# Patient Record
Sex: MALE | Race: WHITE | Employment: FULL TIME | URBAN - METROPOLITAN AREA
[De-identification: names, ages, dates, MRNs, and addresses within clinical notes are randomized per-mention and may not be internally consistent; named-entity substitution may affect disease eponyms.]

---

## 2017-01-29 ENCOUNTER — OFFICE VISIT (OUTPATIENT)
Dept: FAMILY MEDICINE CLINIC | Facility: CLINIC | Age: 32
End: 2017-01-29

## 2017-01-29 VITALS
RESPIRATION RATE: 16 BRPM | SYSTOLIC BLOOD PRESSURE: 122 MMHG | OXYGEN SATURATION: 98 % | DIASTOLIC BLOOD PRESSURE: 80 MMHG | TEMPERATURE: 98 F | HEART RATE: 83 BPM

## 2017-01-29 DIAGNOSIS — K12.0 CANKER SORE: Primary | ICD-10-CM

## 2017-01-29 PROCEDURE — 99213 OFFICE O/P EST LOW 20 MIN: CPT | Performed by: NURSE PRACTITIONER

## 2017-01-29 NOTE — PROGRESS NOTES
CHIEF COMPLAINT:   Patient presents with:  Sore Throat        HPI:   Thien Zazueta is a 28year old male presents to clinic with complaint of sore throat. Patient has had for 4-5 days. Symptoms have been constant since onset.   Patient reports following a LUNGS: clear to auscultation bilaterally, no wheezes or rhonchi. Breathing is non labored.   CARDIO: RRR without murmur  GI: good BS's,no masses, hepatosplenomegaly, or tenderness on direct palpation  EXTREMITIES: no cyanosis, clubbing or edema  LYMPH: no a Canker sores usually go away by themselves within 10 to 14 days. There is no cure for canker sores. Treatment focuses on relieving symptoms and shortening outbreaks.  Treatments may include:  · Prescription or over-the-counter skin treatments to apply to th The patient/parent indicates understanding of these issues and agrees to the plan. The patient is asked to follow up with their PCP as needed.

## 2017-01-29 NOTE — PATIENT INSTRUCTIONS
Understanding Canker Sores  Canker sores are small, painful sores inside the mouth. They occur most often on the tongue, gums, or insides of the cheeks. The medical term for canker sores is aphthous ulcers. What causes a canker sore?   The exact cause of Mouth sores that seem to be canker sores can be signs of a more serious illness. If you have other signs of illness along with mouth sores, you should talk with a healthcare provider.  Canker sores can be so painful that they interfere with talking, eating,

## 2017-02-21 ENCOUNTER — OFFICE VISIT (OUTPATIENT)
Dept: FAMILY MEDICINE CLINIC | Facility: CLINIC | Age: 32
End: 2017-02-21

## 2017-02-21 VITALS
OXYGEN SATURATION: 99 % | RESPIRATION RATE: 18 BRPM | WEIGHT: 187 LBS | HEART RATE: 72 BPM | DIASTOLIC BLOOD PRESSURE: 80 MMHG | SYSTOLIC BLOOD PRESSURE: 120 MMHG | TEMPERATURE: 98 F | BODY MASS INDEX: 25 KG/M2

## 2017-02-21 DIAGNOSIS — J06.9 VIRAL URI: ICD-10-CM

## 2017-02-21 PROCEDURE — 99213 OFFICE O/P EST LOW 20 MIN: CPT | Performed by: PHYSICIAN ASSISTANT

## 2017-02-21 RX ORDER — AMOXICILLIN AND CLAVULANATE POTASSIUM 875; 125 MG/1; MG/1
1 TABLET, FILM COATED ORAL 2 TIMES DAILY
Qty: 20 TABLET | Refills: 0 | Status: SHIPPED | OUTPATIENT
Start: 2017-02-21 | End: 2017-03-03

## 2017-02-21 RX ORDER — FLUTICASONE PROPIONATE 50 MCG
2 SPRAY, SUSPENSION (ML) NASAL DAILY
Qty: 1 BOTTLE | Refills: 1 | Status: SHIPPED | OUTPATIENT
Start: 2017-02-21 | End: 2018-02-16

## 2017-02-21 NOTE — PATIENT INSTRUCTIONS
1.  Flonase as directed. 2 sprays in each nostril daily, or 1 spray in each nostril twice daily. If you develop a nosebleed, stop medication and restart at half the dose (1 spray in each nostril daily) after you have not had a nosebleed for 2 days.   If no · Stay away from cigarette smoke - both your smoke and the smoke from others. · You may use over-the-counter acetaminophen or ibuprofen for fever, muscle aching, and headache, unless another medicine was prescribed for this.  If you have chronic liver or k · Continued vomiting (can’t keep liquids down)  · Frequent diarrhea (more than 5 times a day); blood (red or black color) or mucus in diarrhea  · Feeling weak, dizzy, or like you are going to faint  · Extreme thirst  · Fever of 100.4°F (38°C) or higher, or

## 2017-02-21 NOTE — PROGRESS NOTES
CHIEF COMPLAINT:   Patient presents with:  Cough/URI: x 4 days, nasal congestion, scratchy throat, fatigue, body aches. No fever. HPI:   Liset Oakes is a 28year old male who presents for upper respiratory symptoms for  4 days.  Patient reports sor NOSE: Nostrils patent, clear nasal discharge, nasal mucosa edematous/erythematous   THROAT: Oral mucosa pink, moist. Posterior pharynx is erythematous. without exudates or hypertrophy, uvula midline, clear post nasal drainage.      NECK: Supple, non-tender 2.  Encourage fluids, humidifier/vaporizor at bedside, elevate head of bed (sleep with extra pillow), vapor rub to chest, steam therapy if no fever, warm compresses for sinus pressure if no fever, salt water gargles for sore throat, lozenges for sore throa · You may use over-the-counter acetaminophen or ibuprofen for fever, muscle aching, and headache, unless another medicine was prescribed for this.  If you have chronic liver or kidney disease or ever had a stomach ulcer or GI bleeding, talk with your doctor · Frequent diarrhea (more than 5 times a day); blood (red or black color) or mucus in diarrhea  · Feeling weak, dizzy, or like you are going to faint  · Extreme thirst  · Fever of 100.4°F (38°C) or higher, or as directed by your healthcare provider  Date L

## 2017-07-29 ENCOUNTER — HOSPITAL ENCOUNTER (INPATIENT)
Facility: HOSPITAL | Age: 32
LOS: 7 days | Discharge: HOME | DRG: 339 | End: 2017-08-05
Attending: EMERGENCY MEDICINE | Admitting: INTERNAL MEDICINE
Payer: COMMERCIAL

## 2017-07-29 ENCOUNTER — SURGERY (OUTPATIENT)
Age: 32
End: 2017-07-29

## 2017-07-29 ENCOUNTER — APPOINTMENT (OUTPATIENT)
Dept: CT IMAGING | Age: 32
DRG: 339 | End: 2017-07-29
Attending: EMERGENCY MEDICINE
Payer: COMMERCIAL

## 2017-07-29 ENCOUNTER — ANESTHESIA (OUTPATIENT)
Dept: SURGERY | Facility: HOSPITAL | Age: 32
End: 2017-07-29

## 2017-07-29 ENCOUNTER — ANESTHESIA EVENT (OUTPATIENT)
Dept: SURGERY | Facility: HOSPITAL | Age: 32
End: 2017-07-29

## 2017-07-29 DIAGNOSIS — K35.30 ACUTE APPENDICITIS WITH LOCALIZED PERITONITIS: Primary | ICD-10-CM

## 2017-07-29 DIAGNOSIS — K35.80 ACUTE APPENDICITIS: ICD-10-CM

## 2017-07-29 LAB
ALBUMIN SERPL-MCNC: 4.4 G/DL (ref 3.5–4.8)
ALP LIVER SERPL-CCNC: 39 U/L (ref 45–117)
ALT SERPL-CCNC: 29 U/L (ref 17–63)
AST SERPL-CCNC: 17 U/L (ref 15–41)
BASOPHILS # BLD AUTO: 0.03 X10(3) UL (ref 0–0.1)
BASOPHILS NFR BLD AUTO: 0.1 %
BILIRUB SERPL-MCNC: 1.1 MG/DL (ref 0.1–2)
BILIRUB UR QL STRIP.AUTO: NEGATIVE
BUN BLD-MCNC: 17 MG/DL (ref 8–20)
CALCIUM BLD-MCNC: 9.3 MG/DL (ref 8.3–10.3)
CHLORIDE: 97 MMOL/L (ref 101–111)
CO2: 27 MMOL/L (ref 22–32)
COLOR UR AUTO: YELLOW
CREAT BLD-MCNC: 1.15 MG/DL (ref 0.7–1.3)
EOSINOPHIL # BLD AUTO: 0.01 X10(3) UL (ref 0–0.3)
EOSINOPHIL NFR BLD AUTO: 0 %
ERYTHROCYTE [DISTWIDTH] IN BLOOD BY AUTOMATED COUNT: 13.4 % (ref 11.5–16)
GLUCOSE BLD-MCNC: 163 MG/DL (ref 70–99)
GLUCOSE UR STRIP.AUTO-MCNC: NEGATIVE MG/DL
HCT VFR BLD AUTO: 46.7 % (ref 37–53)
HGB BLD-MCNC: 16 G/DL (ref 13–17)
IMMATURE GRANULOCYTE COUNT: 0.12 X10(3) UL (ref 0–1)
IMMATURE GRANULOCYTE RATIO %: 0.5 %
LEUKOCYTE ESTERASE UR QL STRIP.AUTO: NEGATIVE
LIPASE: 58 U/L (ref 73–393)
LYMPHOCYTES # BLD AUTO: 0.65 X10(3) UL (ref 0.9–4)
LYMPHOCYTES NFR BLD AUTO: 2.8 %
M PROTEIN MFR SERPL ELPH: 8.1 G/DL (ref 6.1–8.3)
MCH RBC QN AUTO: 28.5 PG (ref 27–33.2)
MCHC RBC AUTO-ENTMCNC: 34.3 G/DL (ref 31–37)
MCV RBC AUTO: 83.2 FL (ref 80–99)
MONOCYTES # BLD AUTO: 0.71 X10(3) UL (ref 0.1–0.6)
MONOCYTES NFR BLD AUTO: 3.1 %
NEUTROPHIL ABS PRELIM: 21.47 X10 (3) UL (ref 1.3–6.7)
NEUTROPHILS # BLD AUTO: 21.47 X10(3) UL (ref 1.3–6.7)
NEUTROPHILS NFR BLD AUTO: 93.5 %
NITRITE UR QL STRIP.AUTO: NEGATIVE
PH UR STRIP.AUTO: 5 [PH] (ref 4.5–8)
PLATELET # BLD AUTO: 288 10(3)UL (ref 150–450)
POTASSIUM SERPL-SCNC: 3.5 MMOL/L (ref 3.6–5.1)
PROT UR STRIP.AUTO-MCNC: 30 MG/DL
RBC # BLD AUTO: 5.61 X10(6)UL (ref 4.3–5.7)
RBC UR QL AUTO: NEGATIVE
RED CELL DISTRIBUTION WIDTH-SD: 40.8 FL (ref 35.1–46.3)
SODIUM SERPL-SCNC: 135 MMOL/L (ref 136–144)
SP GR UR STRIP.AUTO: 1.03 (ref 1–1.03)
UROBILINOGEN UR STRIP.AUTO-MCNC: <2 MG/DL
WBC # BLD AUTO: 23 X10(3) UL (ref 4–13)

## 2017-07-29 PROCEDURE — 80053 COMPREHEN METABOLIC PANEL: CPT | Performed by: EMERGENCY MEDICINE

## 2017-07-29 PROCEDURE — 83690 ASSAY OF LIPASE: CPT | Performed by: EMERGENCY MEDICINE

## 2017-07-29 PROCEDURE — 88304 TISSUE EXAM BY PATHOLOGIST: CPT | Performed by: SURGERY

## 2017-07-29 PROCEDURE — 96365 THER/PROPH/DIAG IV INF INIT: CPT

## 2017-07-29 PROCEDURE — 85025 COMPLETE CBC W/AUTO DIFF WBC: CPT | Performed by: EMERGENCY MEDICINE

## 2017-07-29 PROCEDURE — 99285 EMERGENCY DEPT VISIT HI MDM: CPT

## 2017-07-29 PROCEDURE — 96361 HYDRATE IV INFUSION ADD-ON: CPT

## 2017-07-29 PROCEDURE — 0DTJ4ZZ RESECTION OF APPENDIX, PERCUTANEOUS ENDOSCOPIC APPROACH: ICD-10-PCS | Performed by: SURGERY

## 2017-07-29 PROCEDURE — 74176 CT ABD & PELVIS W/O CONTRAST: CPT | Performed by: EMERGENCY MEDICINE

## 2017-07-29 PROCEDURE — 96375 TX/PRO/DX INJ NEW DRUG ADDON: CPT

## 2017-07-29 PROCEDURE — 81001 URINALYSIS AUTO W/SCOPE: CPT | Performed by: SURGERY

## 2017-07-29 RX ORDER — HYDROMORPHONE HYDROCHLORIDE 1 MG/ML
1 INJECTION, SOLUTION INTRAMUSCULAR; INTRAVENOUS; SUBCUTANEOUS EVERY 30 MIN PRN
Status: DISCONTINUED | OUTPATIENT
Start: 2017-07-29 | End: 2017-07-29

## 2017-07-29 RX ORDER — HYDROMORPHONE HYDROCHLORIDE 1 MG/ML
0.8 INJECTION, SOLUTION INTRAMUSCULAR; INTRAVENOUS; SUBCUTANEOUS EVERY 2 HOUR PRN
Status: DISCONTINUED | OUTPATIENT
Start: 2017-07-29 | End: 2017-07-29

## 2017-07-29 RX ORDER — HYDROMORPHONE HYDROCHLORIDE 1 MG/ML
0.4 INJECTION, SOLUTION INTRAMUSCULAR; INTRAVENOUS; SUBCUTANEOUS EVERY 2 HOUR PRN
Status: DISCONTINUED | OUTPATIENT
Start: 2017-07-29 | End: 2017-08-05

## 2017-07-29 RX ORDER — HYDROMORPHONE HYDROCHLORIDE 1 MG/ML
0.4 INJECTION, SOLUTION INTRAMUSCULAR; INTRAVENOUS; SUBCUTANEOUS EVERY 5 MIN PRN
Status: DISCONTINUED | OUTPATIENT
Start: 2017-07-29 | End: 2017-07-29 | Stop reason: HOSPADM

## 2017-07-29 RX ORDER — HYDROMORPHONE HYDROCHLORIDE 1 MG/ML
0.2 INJECTION, SOLUTION INTRAMUSCULAR; INTRAVENOUS; SUBCUTANEOUS EVERY 2 HOUR PRN
Status: DISCONTINUED | OUTPATIENT
Start: 2017-07-29 | End: 2017-07-29

## 2017-07-29 RX ORDER — NALOXONE HYDROCHLORIDE 0.4 MG/ML
80 INJECTION, SOLUTION INTRAMUSCULAR; INTRAVENOUS; SUBCUTANEOUS AS NEEDED
Status: DISCONTINUED | OUTPATIENT
Start: 2017-07-29 | End: 2017-07-29 | Stop reason: HOSPADM

## 2017-07-29 RX ORDER — CALCIUM CARBONATE 200(500)MG
TABLET,CHEWABLE ORAL 3 TIMES DAILY PRN
Status: DISCONTINUED | OUTPATIENT
Start: 2017-07-29 | End: 2017-07-29 | Stop reason: SDUPTHER

## 2017-07-29 RX ORDER — METOCLOPRAMIDE HYDROCHLORIDE 5 MG/ML
10 INJECTION INTRAMUSCULAR; INTRAVENOUS EVERY 6 HOURS PRN
Status: DISCONTINUED | OUTPATIENT
Start: 2017-07-29 | End: 2017-07-30

## 2017-07-29 RX ORDER — HYDROMORPHONE HYDROCHLORIDE 1 MG/ML
0.4 INJECTION, SOLUTION INTRAMUSCULAR; INTRAVENOUS; SUBCUTANEOUS EVERY 2 HOUR PRN
Status: DISCONTINUED | OUTPATIENT
Start: 2017-07-29 | End: 2017-07-29

## 2017-07-29 RX ORDER — HYDROCODONE BITARTRATE AND ACETAMINOPHEN 5; 325 MG/1; MG/1
2 TABLET ORAL EVERY 4 HOURS PRN
Status: DISCONTINUED | OUTPATIENT
Start: 2017-07-29 | End: 2017-08-05

## 2017-07-29 RX ORDER — HYDROCODONE BITARTRATE AND ACETAMINOPHEN 5; 325 MG/1; MG/1
2 TABLET ORAL AS NEEDED
Status: DISCONTINUED | OUTPATIENT
Start: 2017-07-29 | End: 2017-07-29 | Stop reason: HOSPADM

## 2017-07-29 RX ORDER — CEFOXITIN 1 G/1
INJECTION, POWDER, FOR SOLUTION INTRAVENOUS
Status: DISCONTINUED | OUTPATIENT
Start: 2017-07-29 | End: 2017-07-29 | Stop reason: HOSPADM

## 2017-07-29 RX ORDER — ONDANSETRON 2 MG/ML
4 INJECTION INTRAMUSCULAR; INTRAVENOUS AS NEEDED
Status: DISCONTINUED | OUTPATIENT
Start: 2017-07-29 | End: 2017-07-29 | Stop reason: HOSPADM

## 2017-07-29 RX ORDER — ACETAMINOPHEN 325 MG/1
650 TABLET ORAL EVERY 4 HOURS PRN
Status: DISCONTINUED | OUTPATIENT
Start: 2017-07-29 | End: 2017-08-05

## 2017-07-29 RX ORDER — DEXTROSE, SODIUM CHLORIDE, AND POTASSIUM CHLORIDE 5; .45; .15 G/100ML; G/100ML; G/100ML
INJECTION INTRAVENOUS CONTINUOUS
Status: DISCONTINUED | OUTPATIENT
Start: 2017-07-29 | End: 2017-08-05

## 2017-07-29 RX ORDER — ONDANSETRON 2 MG/ML
4 INJECTION INTRAMUSCULAR; INTRAVENOUS EVERY 6 HOURS PRN
Status: DISCONTINUED | OUTPATIENT
Start: 2017-07-29 | End: 2017-07-29

## 2017-07-29 RX ORDER — DEXTROSE, SODIUM CHLORIDE, AND POTASSIUM CHLORIDE 5; .45; .15 G/100ML; G/100ML; G/100ML
INJECTION INTRAVENOUS
Status: COMPLETED
Start: 2017-07-29 | End: 2017-07-29

## 2017-07-29 RX ORDER — HYDROCODONE BITARTRATE AND ACETAMINOPHEN 5; 325 MG/1; MG/1
1 TABLET ORAL EVERY 4 HOURS PRN
Status: DISCONTINUED | OUTPATIENT
Start: 2017-07-29 | End: 2017-08-05

## 2017-07-29 RX ORDER — HYDROMORPHONE HYDROCHLORIDE 1 MG/ML
1.2 INJECTION, SOLUTION INTRAMUSCULAR; INTRAVENOUS; SUBCUTANEOUS EVERY 2 HOUR PRN
Status: DISCONTINUED | OUTPATIENT
Start: 2017-07-29 | End: 2017-08-05

## 2017-07-29 RX ORDER — HYDROCODONE BITARTRATE AND ACETAMINOPHEN 5; 325 MG/1; MG/1
1 TABLET ORAL AS NEEDED
Status: DISCONTINUED | OUTPATIENT
Start: 2017-07-29 | End: 2017-07-29 | Stop reason: HOSPADM

## 2017-07-29 RX ORDER — HYDROMORPHONE HYDROCHLORIDE 1 MG/ML
INJECTION, SOLUTION INTRAMUSCULAR; INTRAVENOUS; SUBCUTANEOUS
Status: COMPLETED
Start: 2017-07-29 | End: 2017-07-29

## 2017-07-29 RX ORDER — ONDANSETRON 2 MG/ML
4 INJECTION INTRAMUSCULAR; INTRAVENOUS ONCE
Status: COMPLETED | OUTPATIENT
Start: 2017-07-29 | End: 2017-07-29

## 2017-07-29 RX ORDER — FAMOTIDINE 20 MG/1
20 TABLET ORAL 2 TIMES DAILY
Status: DISCONTINUED | OUTPATIENT
Start: 2017-07-29 | End: 2017-07-31

## 2017-07-29 RX ORDER — CALCIUM CARBONATE 200(500)MG
1000 TABLET,CHEWABLE ORAL 3 TIMES DAILY PRN
Status: DISCONTINUED | OUTPATIENT
Start: 2017-07-29 | End: 2017-08-05

## 2017-07-29 RX ORDER — HYDROCODONE BITARTRATE AND ACETAMINOPHEN 5; 325 MG/1; MG/1
1 TABLET ORAL EVERY 4 HOURS PRN
Qty: 30 TABLET | Refills: 0 | Status: ON HOLD | OUTPATIENT
Start: 2017-07-29 | End: 2018-11-15

## 2017-07-29 RX ORDER — SODIUM CHLORIDE, SODIUM LACTATE, POTASSIUM CHLORIDE, CALCIUM CHLORIDE 600; 310; 30; 20 MG/100ML; MG/100ML; MG/100ML; MG/100ML
INJECTION, SOLUTION INTRAVENOUS CONTINUOUS
Status: DISCONTINUED | OUTPATIENT
Start: 2017-07-29 | End: 2017-08-04

## 2017-07-29 RX ORDER — ONDANSETRON 2 MG/ML
4 INJECTION INTRAMUSCULAR; INTRAVENOUS EVERY 6 HOURS PRN
Status: DISCONTINUED | OUTPATIENT
Start: 2017-07-29 | End: 2017-08-05

## 2017-07-29 RX ORDER — BUPIVACAINE HYDROCHLORIDE AND EPINEPHRINE 2.5; 5 MG/ML; UG/ML
INJECTION, SOLUTION EPIDURAL; INFILTRATION; INTRACAUDAL; PERINEURAL AS NEEDED
Status: DISCONTINUED | OUTPATIENT
Start: 2017-07-29 | End: 2017-07-29

## 2017-07-29 RX ORDER — SODIUM CHLORIDE 9 MG/ML
INJECTION, SOLUTION INTRAVENOUS CONTINUOUS
Status: DISCONTINUED | OUTPATIENT
Start: 2017-07-29 | End: 2017-07-29

## 2017-07-29 RX ORDER — CALCIUM CARBONATE 200(500)MG
500 TABLET,CHEWABLE ORAL 3 TIMES DAILY PRN
Status: DISCONTINUED | OUTPATIENT
Start: 2017-07-29 | End: 2017-08-05

## 2017-07-29 RX ORDER — HYDROMORPHONE HYDROCHLORIDE 1 MG/ML
0.8 INJECTION, SOLUTION INTRAMUSCULAR; INTRAVENOUS; SUBCUTANEOUS EVERY 2 HOUR PRN
Status: DISCONTINUED | OUTPATIENT
Start: 2017-07-29 | End: 2017-08-05

## 2017-07-29 NOTE — OPERATIVE REPORT
Report of Operation    Jeri Buenotzer Patient Status:  Observation    1985 MRN [id number]   [facility] SURGERY Attending Malinda Zayas   Hosp Day # 0 PCP Corby Kulkarni MD         2017    Quadra Quadra [phone] The mesoappendix was taken down with the LigaSure device. This appendix was then placed in an Endo Catch bag and delivered through the 12-mm port site. All ports were introduced into the abdominal cavity.  Irrigation of the abdomen was performed until clear

## 2017-07-29 NOTE — ED PROVIDER NOTES
Patient Seen in: THE [facility] Emergency Department In [place]    History   Patient presents with:  GI Bleeding (gastrointestinal)    Stated Complaint: vomiting for 36 hours    HPI    Patient states that at about noon on Thursday of this week he developed di BMI 25.77 kg/m²         Physical Exam  The patient appears generally healthy, alert and cooperative. Nontoxic in appearance  Skin: Warm and dry without cyanosis or pallor. Turgor normal.  HEENT: No scleral icterus.   Oropharynx dry  Neck: Supple without Course  ------------------------------------------------------------  MDM     Discussed with Dr. Christopher Garcia and Dr. Sandi Lerner. Patient given dose of Zosyn and transferred to surgical reeves at [facility] for appendectomy.       Disposition and Plan     Clin

## 2017-07-29 NOTE — ED NOTES
Pt denies c/o n/v after Zofran. Pain remain at 3-4/10 after Dilaudid. Pt alert, Ox3, conversing freely w/family at bedside.

## 2017-07-29 NOTE — ANESTHESIA POSTPROCEDURE EVALUATION
[address] [facility] [employer] Patient Status:  Observation   Age/Gender 28year old male MRN [id number]   [facility] SURGERY Attending [place], [facility] [address] Day # 0 PCP Trixie Schmitz MD       Anesthesia Post-op Note    Proced

## 2017-07-29 NOTE — PROGRESS NOTES
PT ARRIVED ON UNIT, VIA OWN CAR FROM SMITA DEWITT.JESENIA. WIFE AT BEDSIDE. PT MADE COMFORTABLE, PLAN OF CARE DISCUSSED. ALL QUESTIONS ANSWERED. INSTRUCTED TO CALL IF ANY NEEDS ARISE. CALL LIGHT WITHIN REACH.

## 2017-07-29 NOTE — PLAN OF CARE
To OR via bed. UA & C/S sent prior to leaving floor. K-Jude started. Wife accompanied pt. Pro-op teaching done.

## 2017-07-29 NOTE — H&P
History and physical    Yahaira Pitcher Patient Status:  Observation    1985 MRN [id number]   [facility] SURGERY Attending Meli Berg,*   Hosp Day # 0 PCP Kathe Foster MD       Chief Complaint:    Abdominal pain    Hi Q6H PRN  •  potassium chloride 40 mEq in sodium chloride 0.9 % 250 mL IVPB, 40 mEq, Intravenous, Once  •  lactated ringers infusion, , Intravenous, Continuous  •  HYDROcodone-acetaminophen (NORCO) 5-325 MG per tab 1 tablet, 1 tablet, Oral, PRN **OR** HYDRO 17   CREATSERUM  1.15   GLU  163*   CA  9.3       Recent Labs   Lab  07/29/17   0335   ALT  29   AST  17   ALB  4.4       No results for input(s): TROP in the last 72 hours.           Radiology:   Ct Appendix Abd/pel Wo Contrast (cpt=74176)    Result Date: or bowel wall thickening. ABDOMINAL WALL:  Normal.  No mass or hernia. URINARY BLADDER:  Normal.  No visible focal wall thickening, lesion, or calculus. PELVIC NODES:  Normal.  No adenopathy. PELVIC ORGANS:  Normal.  No visible mass.   Pelvic organs stacie

## 2017-07-29 NOTE — ED NOTES
Report given to [facility], room 301. Saline lock flushed, Coban applied for transport. Pt to transport to [facility] by private vehicle, aware of NPO status during transport. Spoke w/Grace RN in surgery at ED, aware of pt direct admit to room 301.

## 2017-07-29 NOTE — H&P
[facility] Hospitalist History and Physical      CC: Abd pain    PCP: Sherrill Hernandez MD    History of Present Illness: Patient is a 28year old male with PMH sig for allergic rhinitis presented to ED with a few days of midabdominal pain with nausea and vomitin otherwise noted in HPI    Objective:  /81 (BP Location: Right arm)   Pulse 92   Temp 99.2 °F (37.3 °C) (Oral)   Resp 16   Ht 182.9 cm (6')   Wt 190 lb 12.8 oz (86.5 kg)   SpO2 98%   BMI 25.88 kg/m²     Gen: No acute distress, sleepy  Eyes: Pink conju localized inflammation  - Monitor bowel function    Proph:  - DVT proph with heparin    Not ready for DC today. Outpatient records or previous hospital records reviewed.    [facility] hospitalist to continue to follow patient while in house     More

## 2017-07-29 NOTE — ANESTHESIA PREPROCEDURE EVALUATION
PRE-OP EVALUATION    Patient Name: Santos Chaudhary    Pre-op Diagnosis: Acute appendicitis [K35.80]    Procedure(s):  LAPAROSCOPIC APPENDECTOMY POSS. OPEN    Surgeon(s) and Role:     John Garcia MD - Primary    Pre-op vitals reviewed.   Temp: 99.2 °F (37.3 Cardiovascular    Negative cardiovascular ROS. Exercise tolerance: good     MET: >4                             (-) angina     (-) BECKER         Endo/Other    Negative endo/other ROS.                               Pulmonary  Comment: AR  Negative pulmona

## 2017-07-29 NOTE — PLAN OF CARE
Pt had lap appy this am. Tolerated 1/2 of clear liquid tray. MT drain with small amt serosang drainage present, gauze dressing dry and intact. Lap appy sites with steri strips dry and intact. Rates pain 2/3, declines pain meds.  C/O heartburn pain, Dr Arash Rodriguez

## 2017-07-30 LAB
ALBUMIN SERPL-MCNC: 3.3 G/DL (ref 3.5–4.8)
ALP LIVER SERPL-CCNC: 38 U/L (ref 45–117)
ALT SERPL-CCNC: 26 U/L (ref 17–63)
AST SERPL-CCNC: 12 U/L (ref 15–41)
BILIRUB SERPL-MCNC: 0.6 MG/DL (ref 0.1–2)
BUN BLD-MCNC: 16 MG/DL (ref 8–20)
CALCIUM BLD-MCNC: 8.9 MG/DL (ref 8.3–10.3)
CHLORIDE: 104 MMOL/L (ref 101–111)
CO2: 22 MMOL/L (ref 22–32)
CREAT BLD-MCNC: 1.14 MG/DL (ref 0.7–1.3)
ERYTHROCYTE [DISTWIDTH] IN BLOOD BY AUTOMATED COUNT: 13.7 % (ref 11.5–16)
GLUCOSE BLD-MCNC: 142 MG/DL (ref 70–99)
HCT VFR BLD AUTO: 41.9 % (ref 37–53)
HGB BLD-MCNC: 13.7 G/DL (ref 13–17)
M PROTEIN MFR SERPL ELPH: 7.5 G/DL (ref 6.1–8.3)
MCH RBC QN AUTO: 28.1 PG (ref 27–33.2)
MCHC RBC AUTO-ENTMCNC: 32.7 G/DL (ref 31–37)
MCV RBC AUTO: 86 FL (ref 80–99)
PLATELET # BLD AUTO: 244 10(3)UL (ref 150–450)
POTASSIUM SERPL-SCNC: 3.8 MMOL/L (ref 3.6–5.1)
POTASSIUM SERPL-SCNC: 3.9 MMOL/L (ref 3.6–5.1)
RBC # BLD AUTO: 4.87 X10(6)UL (ref 4.3–5.7)
RED CELL DISTRIBUTION WIDTH-SD: 42.8 FL (ref 35.1–46.3)
SODIUM SERPL-SCNC: 138 MMOL/L (ref 136–144)
WBC # BLD AUTO: 18.9 X10(3) UL (ref 4–13)

## 2017-07-30 PROCEDURE — 84132 ASSAY OF SERUM POTASSIUM: CPT | Performed by: INTERNAL MEDICINE

## 2017-07-30 PROCEDURE — 80053 COMPREHEN METABOLIC PANEL: CPT | Performed by: HOSPITALIST

## 2017-07-30 PROCEDURE — 85027 COMPLETE CBC AUTOMATED: CPT | Performed by: SURGERY

## 2017-07-30 PROCEDURE — 87086 URINE CULTURE/COLONY COUNT: CPT | Performed by: UROLOGY

## 2017-07-30 RX ORDER — LORAZEPAM 2 MG/ML
0.5 INJECTION INTRAMUSCULAR EVERY 6 HOURS PRN
Status: DISCONTINUED | OUTPATIENT
Start: 2017-07-30 | End: 2017-08-05

## 2017-07-30 RX ORDER — POTASSIUM CHLORIDE 14.9 MG/ML
20 INJECTION INTRAVENOUS ONCE
Status: COMPLETED | OUTPATIENT
Start: 2017-07-30 | End: 2017-07-31

## 2017-07-30 RX ORDER — METOCLOPRAMIDE HYDROCHLORIDE 5 MG/ML
10 INJECTION INTRAMUSCULAR; INTRAVENOUS EVERY 6 HOURS
Status: DISCONTINUED | OUTPATIENT
Start: 2017-07-30 | End: 2017-08-05

## 2017-07-30 RX ORDER — SODIUM CHLORIDE 9 MG/ML
1000 INJECTION, SOLUTION INTRAVENOUS ONCE
Status: COMPLETED | OUTPATIENT
Start: 2017-07-30 | End: 2017-07-30

## 2017-07-30 RX ORDER — POTASSIUM CHLORIDE 20 MEQ/1
40 TABLET, EXTENDED RELEASE ORAL ONCE
Status: DISCONTINUED | OUTPATIENT
Start: 2017-07-30 | End: 2017-07-30

## 2017-07-30 NOTE — PROGRESS NOTES
Pt requesting Dilaudid prior to catheter insertion. Coude cath attempted with no success. [facility] urology paged but unable to reach at this time. Obtained Dr. Maria Guadalupe Aguilar cell phone number and contacted him.  Per Dr. Gene Darnell, Dr Miles Almaraz was on call and cell phone nu

## 2017-07-30 NOTE — PROGRESS NOTES
[facility] Hospitalist Progress Note     Messi Davis Patient Status:  Observation    1985 MRN [id number]   [facility] 3NW-A Attending Branden Alanis,*   Hosp Day # 0 PCP Yudi Burroughs MD     CC: follow up    SUBJECTIVE:  Pain acetaminophen **OR** HYDROcodone-acetaminophen **OR** HYDROcodone-acetaminophen, HYDROmorphone HCl PF **OR** HYDROmorphone HCl PF **OR** HYDROmorphone HCl PF, ondansetron HCl, Calcium Carbonate Antacid **OR** Calcium Carbonate Antacid        Assessment/Chris

## 2017-07-30 NOTE — PLAN OF CARE
Patient able to sleep. VSS. Hiccups resolved at this time. All safety measures in place. Will continue to monitor.

## 2017-07-30 NOTE — PROGRESS NOTES
Patient A&O, VSS. Patient states no pain at this time. Full liquid diet, with no reports of nausea. RA, with pulse ox in place. SCD's in place. Abdominal lap sites clean, dry, and intact. MT drain intact. IV fluids and antibiotics infusing.      Patient Peder Gaudencioter

## 2017-07-30 NOTE — PROGRESS NOTES
Dr Dianne Fernandes returned page. MD updated on chain of events throughout night. MD made aware that Guerra was ordered to be DC but wasn't until MD was made aware of color and consistency of Guerra and MT drain. Ordered to not DC guerra and give 500ml NS bolus.  MD ni

## 2017-07-30 NOTE — PROGRESS NOTES
Attempted to straight cath pt with no success. Unable to contact surgeon therefore primary paged. Dr. Lesa Costello updated that pt is unable to void post op and multiple attempts were made. Ordered to insert with a coude and if unable consult [facility] urology.  P

## 2017-07-30 NOTE — PROGRESS NOTES
[facility]  Progress Note    Oskar Pringle Patient Status:  Observation    1985 MRN [id number]   [facility] 3NW-A Attending Abby Sanchez,*   Hosp Day # 0 PCP Ofe Bee MD     Subjective:    Patient describes a m

## 2017-07-30 NOTE — CONSULTS
[facility] Urology  Consultation Note    Shelia Simmons Patient Status:  Observation    1985 MRN [id number]   [facility] 3NW-A Attending Jazzmine Woo,*   Hosp Day # 0 PCP Faraz Langston MD     [facility] for [facility] ([facility]) lactated ringers infusion, , Intravenous, Continuous  •  dextrose 5 % and 0.45 % NaCl with KCl 20 mEq infusion, , Intravenous, Continuous  •  HYDROmorphone HCl PF (DILAUDID) 1 MG/ML injection 0.4 mg, 0.4 mg, Intravenous, Q2H PRN **OR** HYDROmorphone HCl P intra-abdominal process of ileus; should resolve over next 1-2 days, OK to void trial tomorrow if general condition improves  2. Cloudy urine - await urine cultures   3. Following     Thank you for allowing me to participate in the care of your patient.

## 2017-07-30 NOTE — PROGRESS NOTES
Dr. Mortimer Latch returned page. MD notified of consult for unsuccessful cath insertion post op. MD updated on pts status and that there were multiple unsuccessful attempts to insert straight cath but last attempt was successful.  MD ordered to DC catheter an

## 2017-07-30 NOTE — PROGRESS NOTES
Pt updated on POC. When emptying guerra drainage bag urine appeared yellow, cloudy and thicker in consistency. 150ml out. MT drain emptied, output bloody with yellow tinge, cloudy and same consistency as urine, 40ml out. VS done and recorded.  Dilaudid and R

## 2017-07-30 NOTE — PROGRESS NOTES
Pt still unable to void independently and becoming more uncomfortable. Coude attempted by Sergio Barr RN and insertion was successful. 600ml of kerwin urine out. Henley left in until urology is contacted for further orders.

## 2017-07-30 NOTE — PLAN OF CARE
NG Tube inserted, emesis x2, 550cc's, green residual noted. Bolus to be given. Will continue to monitor patient. All questions and concerns addressed.

## 2017-07-31 ENCOUNTER — APPOINTMENT (OUTPATIENT)
Dept: GENERAL RADIOLOGY | Facility: HOSPITAL | Age: 32
DRG: 339 | End: 2017-07-31
Attending: SURGERY
Payer: COMMERCIAL

## 2017-07-31 LAB
ALBUMIN SERPL-MCNC: 2.6 G/DL (ref 3.5–4.8)
ALP LIVER SERPL-CCNC: 37 U/L (ref 45–117)
ALT SERPL-CCNC: 17 U/L (ref 17–63)
AST SERPL-CCNC: 10 U/L (ref 15–41)
BILIRUB SERPL-MCNC: 0.5 MG/DL (ref 0.1–2)
BUN BLD-MCNC: 16 MG/DL (ref 8–20)
CALCIUM BLD-MCNC: 8.3 MG/DL (ref 8.3–10.3)
CHLORIDE: 103 MMOL/L (ref 101–111)
CO2: 27 MMOL/L (ref 22–32)
CREAT BLD-MCNC: 1.1 MG/DL (ref 0.7–1.3)
ERYTHROCYTE [DISTWIDTH] IN BLOOD BY AUTOMATED COUNT: 13.9 % (ref 11.5–16)
GLUCOSE BLD-MCNC: 154 MG/DL (ref 70–99)
HCT VFR BLD AUTO: 44.4 % (ref 37–53)
HGB BLD-MCNC: 14.6 G/DL (ref 13–17)
M PROTEIN MFR SERPL ELPH: 6.2 G/DL (ref 6.1–8.3)
MCH RBC QN AUTO: 28.1 PG (ref 27–33.2)
MCHC RBC AUTO-ENTMCNC: 32.9 G/DL (ref 31–37)
MCV RBC AUTO: 85.5 FL (ref 80–99)
PLATELET # BLD AUTO: 309 10(3)UL (ref 150–450)
POTASSIUM SERPL-SCNC: 4.3 MMOL/L (ref 3.6–5.1)
POTASSIUM SERPL-SCNC: 4.3 MMOL/L (ref 3.6–5.1)
RBC # BLD AUTO: 5.19 X10(6)UL (ref 4.3–5.7)
RED CELL DISTRIBUTION WIDTH-SD: 43.4 FL (ref 35.1–46.3)
SODIUM SERPL-SCNC: 137 MMOL/L (ref 136–144)
WBC # BLD AUTO: 15.1 X10(3) UL (ref 4–13)

## 2017-07-31 PROCEDURE — 85027 COMPLETE CBC AUTOMATED: CPT | Performed by: SURGERY

## 2017-07-31 PROCEDURE — 80053 COMPREHEN METABOLIC PANEL: CPT | Performed by: SURGERY

## 2017-07-31 PROCEDURE — S0028 INJECTION, FAMOTIDINE, 20 MG: HCPCS | Performed by: PHYSICIAN ASSISTANT

## 2017-07-31 PROCEDURE — 84132 ASSAY OF SERUM POTASSIUM: CPT | Performed by: SURGERY

## 2017-07-31 PROCEDURE — 74020 XR ABDOMEN, OBSTRUCTIVE SERIES (CPT=74020): CPT | Performed by: SURGERY

## 2017-07-31 RX ORDER — FAMOTIDINE 10 MG/ML
20 INJECTION, SOLUTION INTRAVENOUS 2 TIMES DAILY
Status: DISCONTINUED | OUTPATIENT
Start: 2017-07-31 | End: 2017-08-05

## 2017-07-31 RX ORDER — METOPROLOL TARTRATE 5 MG/5ML
5 INJECTION INTRAVENOUS EVERY 6 HOURS PRN
Status: DISCONTINUED | OUTPATIENT
Start: 2017-07-31 | End: 2017-08-05

## 2017-07-31 NOTE — PROGRESS NOTES
WRITER OF NOTE ASSUMED CARE FROM OFF GOING RN, PT RESTING IN BED, EASY NON LABORED BREATHING ON RA. NG TO LIS IN PLACE. ABD. DRESSING IN PLACE, JPX1, CPOX AND TELEMETRY IN PLACE. CONTRERAS DRAINING CONCENTRATED URINE. BILATERAL SCD'S IN PLACE.  CALL LIGHT WITHI

## 2017-07-31 NOTE — PROGRESS NOTES
Pt with low grade temperature of 99.5. Pt resting in bed. Pt did ambulte this am in hallways and encouraged to ambulate at this time. Pt stated he does have family coming to visit him but will ambulate after they leave.  IS use encouraged and pt reaching 10

## 2017-07-31 NOTE — PLAN OF CARE
GASTROINTESTINAL - ADULT    • Minimal or absence of nausea and vomiting Progressing    • Maintains or returns to baseline bowel function Progressing        GENITOURINARY - ADULT    • Absence of urinary retention Progressing        PAIN - ADULT    • Magalys Ascencio

## 2017-07-31 NOTE — PROGRESS NOTES
[facility]  Progress Note    Alejos Meigs Patient Status:  Inpatient    1985 MRN [id number]   [facility] 3NW-A Attending Blanca Madrid MD   Hosp Day # 2 PCP Leah MD Aileen     Subjective:    Patient denies any flatus, some

## 2017-07-31 NOTE — PROGRESS NOTES
Pepcid PO ordered for patient and pt npo. Jenny KHAN for surgery paged and new order received to give IV pepcid. Will implement and continue to monitor.

## 2017-07-31 NOTE — PLAN OF CARE
Maintains or returns to baseline bowel function Not Progressing      Absence of urinary retention Not Progressing      Minimal or absence of nausea and vomiting Progressing      Verbalizes/displays adequate comfort level or patient's stated pain goal Progr

## 2017-07-31 NOTE — PROGRESS NOTES
Noted b/p 151/100 pulse 120  At rest.no hiccups. Call to dr casas update on findings. Review of pt uop in 5 hours 300cc cloudy yellow urine. J.p. Remains large amt. Placed pt on tele and metoprolol ordered. Plan of care explained to pt.

## 2017-07-31 NOTE — PAYOR COMM NOTE
--------------  ADMISSION REVIEW     Payor: [employer]  Subscriber #:  [id number]  Authorization Number: N/A    Admit date: 7/29/17  Admit time: 7537       Admitting Physician: Sesar Hough DO  Attending Physician:  Uri Harmon Yes           1.0 oz/week     Cans of beer: 2 per week      Review of Systems    Positive for stated complaint: vomiting for 36 hours  Other systems are as noted in HPI. Constitutional and vital signs reviewed.       All other systems reviewed and negative DIFFERENTIAL WITH PLATELET.   Procedure                               Abnormality         Status                     ---------                               -----------         ------                     CBC W/ DIFFERENTIAL[[id number]]          Abnormal vomiting. Patient did experience a fever. Patient underwent a CT scan which confirmed an acute appendicitis. Past MedicalHistory:    Past Medical History:   Diagnosis Date   • Allergic rhinitis        Past Surgical History:    History reviewed.  No per current facility-administered medications on file prior to encounter. Current Outpatient Prescriptions on File Prior to Encounter:  Pseudoephedrine HCl (SUDAFED 12 HOUR OR) Take by mouth.  Disp:  Rfl:    Fluticasone Propionate 50 MCG/ACT Nasal Suspension transmitted to the [facility] of Radiology) Radha. Jeni Garcia 35 ([address]) which includes the Dose Index Registry.   PATIENT STATED HISTORY: (As transcribed by Technologist)  Right lower quadrant pain with fever, nausea, vomiting, and di Problem List:    Patient Active Problem List:     Acute appendicitis with localized peritonitis      Impression:    Acute appendicitis    Plan:    Laparoscopic appendectomy with possible open conversion.   The risks, benefits and alternatives to caren User    7/31/2017 0346 New Bag 3.375 g Intravenous Alberta Fill, RN    7/30/2017 2003 New Bag 3.375 g Intravenous Avery Covert, RN    7/30/2017 1200 New Bag 3.375 g Intravenous Degroote, Didier Hernandez, RN      potassium chloride IVPB premix 20 mEq was introduced through the umbilical incision and laparoscope was introduced. A 5-mm trocar was placed in the suprapubic region and a 12-mm trocar was placed in the left lower abdomen. Appendix showed evidence of  Appendicitis.   Omental and small bowel adh

## 2017-07-31 NOTE — PROGRESS NOTES
ANXIOUS, C/O OF HICCUPS WOKE HIM UP. B/P  AND PULSE ELEVATED. REQUESTING PAIN MED.  MED AND CONT TO MONITOR PULSE AND B/P

## 2017-07-31 NOTE — PROGRESS NOTES
[facility]  Urology Progress Note    Neris Dangelo Patient Status:  Inpatient    1985 MRN [id number]   [facility] 3NW-A Attending Pablo Menezes MD   Hosp Day # 2 PCP Trixie Schmitz MD     Subjective:  Neris Dangelo is a(n) 31

## 2017-08-01 LAB
BASOPHILS # BLD AUTO: 0.03 X10(3) UL (ref 0–0.1)
BASOPHILS NFR BLD AUTO: 0.3 %
EOSINOPHIL # BLD AUTO: 0.02 X10(3) UL (ref 0–0.3)
EOSINOPHIL NFR BLD AUTO: 0.2 %
ERYTHROCYTE [DISTWIDTH] IN BLOOD BY AUTOMATED COUNT: 13.5 % (ref 11.5–16)
HCT VFR BLD AUTO: 42.6 % (ref 37–53)
HGB BLD-MCNC: 14.2 G/DL (ref 13–17)
IMMATURE GRANULOCYTE COUNT: 0.05 X10(3) UL (ref 0–1)
IMMATURE GRANULOCYTE RATIO %: 0.5 %
LYMPHOCYTES # BLD AUTO: 1.26 X10(3) UL (ref 0.9–4)
LYMPHOCYTES NFR BLD AUTO: 13.4 %
MCH RBC QN AUTO: 28.3 PG (ref 27–33.2)
MCHC RBC AUTO-ENTMCNC: 33.3 G/DL (ref 31–37)
MCV RBC AUTO: 85 FL (ref 80–99)
MONOCYTES # BLD AUTO: 0.69 X10(3) UL (ref 0.1–0.6)
MONOCYTES NFR BLD AUTO: 7.4 %
NEUTROPHIL ABS PRELIM: 7.33 X10 (3) UL (ref 1.3–6.7)
NEUTROPHILS # BLD AUTO: 7.33 X10(3) UL (ref 1.3–6.7)
NEUTROPHILS NFR BLD AUTO: 78.2 %
PLATELET # BLD AUTO: 281 10(3)UL (ref 150–450)
RBC # BLD AUTO: 5.01 X10(6)UL (ref 4.3–5.7)
RED CELL DISTRIBUTION WIDTH-SD: 42.3 FL (ref 35.1–46.3)
WBC # BLD AUTO: 9.4 X10(3) UL (ref 4–13)

## 2017-08-01 PROCEDURE — S0028 INJECTION, FAMOTIDINE, 20 MG: HCPCS | Performed by: PHYSICIAN ASSISTANT

## 2017-08-01 PROCEDURE — 85025 COMPLETE CBC W/AUTO DIFF WBC: CPT | Performed by: PHYSICIAN ASSISTANT

## 2017-08-01 RX ORDER — HEPARIN SODIUM 5000 [USP'U]/ML
5000 INJECTION, SOLUTION INTRAVENOUS; SUBCUTANEOUS EVERY 8 HOURS SCHEDULED
Status: DISCONTINUED | OUTPATIENT
Start: 2017-08-01 | End: 2017-08-05

## 2017-08-01 NOTE — PROGRESS NOTES
[facility] Hospitalist Progress Note     Jaquita Collet Patient Status:  Observation    1985 MRN [id number]   [facility] 3NW-A Attending Anabel Butts,*   Hosp Day # 3 PCP Tommy Light MD     CC: follow up    SUBJECTIVE:  Ambu male with acute appendicitis     Acute Appendicitis with perforation and periappendiceal and intraloop abscess  - S/P lap appy and drainage of abscess 7/29  - Seen post-op  - Continue Abx per surgery today- on Zosyn   - IVF, diet per surgery    Ileus  - 2/

## 2017-08-01 NOTE — PROGRESS NOTES
[facility]  Progress Note    Fabiano Bilis Patient Status:  Inpatient    1985 MRN [id number]   [facility] 3NW-A Attending Nato Kraft MD   Hosp Day # 3 PCP Myles Mccall MD     Subjective:    Patient denies any nausea, note

## 2017-08-01 NOTE — PROGRESS NOTES
[facility] Hospitalist Progress Note     Toño Ambrose Patient Status:  Observation    1985 MRN [id number]   [facility] 3NW-A Attending Lionel Francis,*   Hosp Day # 2 PCP Serene Mike MD     CC: follow up    SUBJECTIVE:  Pain **OR** HYDROmorphone HCl PF, ondansetron HCl, Calcium Carbonate Antacid **OR** Calcium Carbonate Antacid        Assessment/Plan:     27 yo otherwise healthy male with acute appendicitis     Acute Appendicitis with perforation and periappendiceal and intral

## 2017-08-01 NOTE — PLAN OF CARE
GASTROINTESTINAL - ADULT    • Minimal or absence of nausea and vomiting Progressing    • Maintains or returns to baseline bowel function Progressing        GENITOURINARY - ADULT    • Absence of urinary retention Progressing        PAIN - ADULT    • Nj Vega

## 2017-08-01 NOTE — PROGRESS NOTES
[facility]  Urology Progress Note    Radha Welsh Patient Status:  Inpatient    1985 MRN [id number]   [facility] 3NW-A Attending Cici Pichardo MD   Hosp Day # 3 PCP Sergio Spann MD     Subjective:  Radha Welsh is a(n) 31

## 2017-08-01 NOTE — PLAN OF CARE
GASTROINTESTINAL - ADULT    • Minimal or absence of nausea and vomiting Progressing    • Maintains or returns to baseline bowel function Progressing        GENITOURINARY - ADULT    • Absence of urinary retention Progressing        PAIN - ADULT    • Desmond Chatterjeea

## 2017-08-02 LAB
BUN BLD-MCNC: 12 MG/DL (ref 8–20)
CALCIUM BLD-MCNC: 8.2 MG/DL (ref 8.3–10.3)
CHLORIDE: 103 MMOL/L (ref 101–111)
CO2: 28 MMOL/L (ref 22–32)
CREAT BLD-MCNC: 0.88 MG/DL (ref 0.7–1.3)
GLUCOSE BLD-MCNC: 105 MG/DL (ref 70–99)
POTASSIUM SERPL-SCNC: 3.9 MMOL/L (ref 3.6–5.1)
SODIUM SERPL-SCNC: 137 MMOL/L (ref 136–144)

## 2017-08-02 PROCEDURE — 80048 BASIC METABOLIC PNL TOTAL CA: CPT | Performed by: HOSPITALIST

## 2017-08-02 PROCEDURE — S0028 INJECTION, FAMOTIDINE, 20 MG: HCPCS | Performed by: PHYSICIAN ASSISTANT

## 2017-08-02 NOTE — PAYOR COMM NOTE
--------------  CONTINUED STAY REVIEW    Payor: [employer] PPO    8/1    NG OUTPUT 1000CC X 24 HRS  MT WITH SEROUS OUTPUT  ABD DISTENDED  TENDER  BS HYPOACTIVE            Lab Results  Component Value Date   WBC 9.4 08/01/2017   HGB 14.2 08/01/2017   HCT 42.6 08/

## 2017-08-02 NOTE — PROGRESS NOTES
[facility] Hospitalist Progress Note     Oskar Pringle Patient Status:  Observation    1985 MRN [id number]   [facility] 3NW-A Attending Abby Sanchez,*   Hosp Day # 4 PCP Ofe Bee MD     CC: follow up    SUBJECTIVE:  +fla appy and drainage of abscess 7/29  - Seen post-op  - Continue Abx per surgery today- on Zosyn   - IVF, diet per surgery    Ileus  - 2/2 above  - NG placed with resolution of pt's nausea  - NPO  - obstructive series with concerns for SBO  - +flatus and +BM

## 2017-08-02 NOTE — PLAN OF CARE
Patient ambulating halls several times today. NG has been clamped 6 hours. Denies any abodominal discomfort, nausea/vomiting. Residual check was 2-3 CC's, bile. NG d/c'd as ordered. Will start clear liquid.

## 2017-08-02 NOTE — PROGRESS NOTES
[facility]  Progress Note    Oskar Pringle Patient Status:  Inpatient    1985 MRN [id number]   [facility] 3NW-A Attending My Pennington MD   Hosp Day # 4 PCP Ofe Bee MD     Subjective:    Patient reports pain controlled

## 2017-08-02 NOTE — PLAN OF CARE
GASTROINTESTINAL - ADULT    • Minimal or absence of nausea and vomiting Progressing    • Maintains or returns to baseline bowel function Progressing        GENITOURINARY - ADULT    • Absence of urinary retention Progressing        PAIN - ADULT    • Chip Beyer

## 2017-08-03 LAB
BASOPHILS # BLD AUTO: 0.02 X10(3) UL (ref 0–0.1)
BASOPHILS NFR BLD AUTO: 0.2 %
BUN BLD-MCNC: 9 MG/DL (ref 8–20)
CALCIUM BLD-MCNC: 8.9 MG/DL (ref 8.3–10.3)
CHLORIDE: 102 MMOL/L (ref 101–111)
CO2: 28 MMOL/L (ref 22–32)
CREAT BLD-MCNC: 0.99 MG/DL (ref 0.7–1.3)
EOSINOPHIL # BLD AUTO: 0.11 X10(3) UL (ref 0–0.3)
EOSINOPHIL NFR BLD AUTO: 1.1 %
ERYTHROCYTE [DISTWIDTH] IN BLOOD BY AUTOMATED COUNT: 13.6 % (ref 11.5–16)
GLUCOSE BLD-MCNC: 112 MG/DL (ref 70–99)
HCT VFR BLD AUTO: 41 % (ref 37–53)
HGB BLD-MCNC: 13.8 G/DL (ref 13–17)
IMMATURE GRANULOCYTE COUNT: 0.08 X10(3) UL (ref 0–1)
IMMATURE GRANULOCYTE RATIO %: 0.8 %
LYMPHOCYTES # BLD AUTO: 1.07 X10(3) UL (ref 0.9–4)
LYMPHOCYTES NFR BLD AUTO: 10.6 %
MCH RBC QN AUTO: 28.7 PG (ref 27–33.2)
MCHC RBC AUTO-ENTMCNC: 33.7 G/DL (ref 31–37)
MCV RBC AUTO: 85.2 FL (ref 80–99)
MONOCYTES # BLD AUTO: 0.61 X10(3) UL (ref 0.1–0.6)
MONOCYTES NFR BLD AUTO: 6 %
NEUTROPHIL ABS PRELIM: 8.21 X10 (3) UL (ref 1.3–6.7)
NEUTROPHILS # BLD AUTO: 8.21 X10(3) UL (ref 1.3–6.7)
NEUTROPHILS NFR BLD AUTO: 81.3 %
PLATELET # BLD AUTO: 302 10(3)UL (ref 150–450)
POTASSIUM SERPL-SCNC: 3.7 MMOL/L (ref 3.6–5.1)
RBC # BLD AUTO: 4.81 X10(6)UL (ref 4.3–5.7)
RED CELL DISTRIBUTION WIDTH-SD: 42.3 FL (ref 35.1–46.3)
SODIUM SERPL-SCNC: 137 MMOL/L (ref 136–144)
WBC # BLD AUTO: 10.1 X10(3) UL (ref 4–13)

## 2017-08-03 PROCEDURE — 85025 COMPLETE CBC W/AUTO DIFF WBC: CPT | Performed by: HOSPITALIST

## 2017-08-03 PROCEDURE — S0028 INJECTION, FAMOTIDINE, 20 MG: HCPCS | Performed by: PHYSICIAN ASSISTANT

## 2017-08-03 PROCEDURE — 80048 BASIC METABOLIC PNL TOTAL CA: CPT | Performed by: HOSPITALIST

## 2017-08-03 RX ORDER — POTASSIUM CHLORIDE 14.9 MG/ML
20 INJECTION INTRAVENOUS ONCE
Status: COMPLETED | OUTPATIENT
Start: 2017-08-03 | End: 2017-08-03

## 2017-08-03 NOTE — PROGRESS NOTES
[facility]  Progress Note    Rupal Boland Patient Status:  Inpatient    1985 MRN [id number]   [facility] 3NW-A Attending Yasmin Pineda MD   Hosp Day # 5 PCP Peña Hylton MD     Subjective:    Patient tolerated NG clamping y

## 2017-08-03 NOTE — PLAN OF CARE
GASTROINTESTINAL - ADULT    • Minimal or absence of nausea and vomiting Progressing    • Maintains or returns to baseline bowel function Progressing        GENITOURINARY - ADULT    • Absence of urinary retention Progressing        PAIN - ADULT    • Cassidy Stake

## 2017-08-03 NOTE — PROGRESS NOTES
[facility] Hospitalist Progress Note     Neris Dangelo Patient Status:  Observation    1985 MRN [id number]   [facility] 3NW-A Attending Matteo Pena,*   Hosp Day # 5 PCP Trixie Schmitz MD     CC: follow up    SUBJECTIVE:  Pt i Appendicitis with perforation and periappendiceal and intraloop abscess  - S/P lap appy and drainage of abscess 7/29  - Seen post-op  - Continue Abx per surgery today- on Zosyn   - IVF, diet per surgery    Ileus  - 2/2 above  - NG placed with resolution of

## 2017-08-03 NOTE — PLAN OF CARE
Reinforce I/S use , Zosyn ABT continues as ordered , instructed to have staff assist when ambulating , and call staff for all assist and needs , denies nausea , soft tender abdomen on left lower side . MT draining small amount of serous drainage .

## 2017-08-03 NOTE — PROGRESS NOTES
[facility]  Urology Progress Note    Liset Oakes Patient Status:  Inpatient    1985 MRN [id number]   [facility] 3NW-A Attending Kevin Allison MD   Hosp Day # 5 PCP Navarro Horta MD     Subjective:  Liset Oakes is a(n) 31

## 2017-08-04 LAB — POTASSIUM SERPL-SCNC: 4.2 MMOL/L (ref 3.6–5.1)

## 2017-08-04 PROCEDURE — S0028 INJECTION, FAMOTIDINE, 20 MG: HCPCS | Performed by: PHYSICIAN ASSISTANT

## 2017-08-04 PROCEDURE — 84132 ASSAY OF SERUM POTASSIUM: CPT | Performed by: INTERNAL MEDICINE

## 2017-08-04 RX ORDER — DIPHENHYDRAMINE HCL 25 MG
25 CAPSULE ORAL EVERY 6 HOURS PRN
Status: DISCONTINUED | OUTPATIENT
Start: 2017-08-04 | End: 2017-08-05

## 2017-08-04 RX ORDER — LEVOFLOXACIN 750 MG/1
750 TABLET ORAL DAILY
Qty: 10 TABLET | Refills: 0 | Status: SHIPPED | OUTPATIENT
Start: 2017-08-04 | End: 2017-08-14

## 2017-08-04 RX ORDER — METRONIDAZOLE 500 MG/1
500 TABLET ORAL 3 TIMES DAILY
Qty: 30 TABLET | Refills: 0 | Status: SHIPPED | OUTPATIENT
Start: 2017-08-04 | End: 2017-08-14

## 2017-08-04 RX ORDER — TRAZODONE HYDROCHLORIDE 50 MG/1
25 TABLET ORAL NIGHTLY
Status: DISCONTINUED | OUTPATIENT
Start: 2017-08-04 | End: 2017-08-05

## 2017-08-04 RX ORDER — HYDROCODONE BITARTRATE AND ACETAMINOPHEN 5; 325 MG/1; MG/1
1 TABLET ORAL EVERY 4 HOURS PRN
Qty: 30 TABLET | Refills: 0 | Status: SHIPPED | OUTPATIENT
Start: 2017-08-04 | End: 2017-08-14

## 2017-08-04 NOTE — PLAN OF CARE
GENITOURINARY - ADULT    • Absence of urinary retention Completed          GASTROINTESTINAL - ADULT    • Minimal or absence of nausea and vomiting Progressing    • Maintains or returns to baseline bowel function Progressing        PAIN - ADULT    • Dickson Estes

## 2017-08-04 NOTE — PLAN OF CARE
Problem: PAIN - ADULT  Goal: Verbalizes/displays adequate comfort level or patient's stated pain goal  INTERVENTIONS:  - Encourage pt to monitor pain and request assistance  - Assess pain using appropriate pain scale  - Administer analgesics based on type A&OX4 with no neuro deficits noted. Patient encouraged to ambulate at least 3-4 times per day, patient verbalizes understanding.  Writing RN encouraged patient to continue using incentive spirometer and pillow as splint with coughing and deep breathing, chinedu

## 2017-08-04 NOTE — PROGRESS NOTES
[facility]  Progress Note    Toño Ambrose Patient Status:  Inpatient    1985 MRN [id number]   [facility] 3NW-A Attending Son Jameson MD   Hosp Day # 6 PCP Serene Mike MD     Subjective:    Patient has tolerated clears, h

## 2017-08-04 NOTE — PROGRESS NOTES
The patient states that he is passing very little flatus and stool, patient encouraged to continue ambulating in the hallways to facilitate intestinal motility.

## 2017-08-04 NOTE — PROGRESS NOTES
[facility] Hospitalist Progress Note     Paolo Wang Patient Status:  Observation    1985 MRN [id number]   [facility] 3NW-A Attending Yasmin Middleton,*   Hosp Day # 6 PCP Christopher Gomez MD     CC: follow up    SUBJECTIVE:  Pt i **OR** Calcium Carbonate Antacid        Assessment/Plan:     29 yo otherwise healthy male with acute appendicitis     Acute Appendicitis with perforation and periappendiceal and intraloop abscess  - S/P lap appy and drainage of abscess 7/29  - Seen post-op

## 2017-08-04 NOTE — PAYOR COMM NOTE
--------------  CONTINUED STAY REVIEW     8/3    Subjective:     Patient tolerated NG clamping yesterday and clears yesterday evening.  He does report some abdominal cramping this morning. + flatus, denies any nausea.           Vital Signs:  Blood pressure

## 2017-08-05 VITALS
BODY MASS INDEX: 25.84 KG/M2 | DIASTOLIC BLOOD PRESSURE: 77 MMHG | WEIGHT: 190.81 LBS | RESPIRATION RATE: 14 BRPM | OXYGEN SATURATION: 99 % | SYSTOLIC BLOOD PRESSURE: 127 MMHG | TEMPERATURE: 98 F | HEART RATE: 78 BPM | HEIGHT: 72 IN

## 2017-08-05 PROCEDURE — S0028 INJECTION, FAMOTIDINE, 20 MG: HCPCS | Performed by: PHYSICIAN ASSISTANT

## 2017-08-05 NOTE — PROGRESS NOTES
[facility]  Progress Note    Fidelia Batista Patient Status:  Inpatient    1985 MRN [id number]   [facility] 3NW-A Attending Heath Salazar MD   Hosp Day # 7 PCP Pako Parham MD     Subjective:  Feels well.  Tolerating diet    Obj

## 2017-08-05 NOTE — PROGRESS NOTES
States feels ready to go home. Tolerated solid food today. Pain controlled with norco.Written and verbal discharge instructions given to patient and verbalize understanding.  IV discontinued in [address], angio-cath tip intact, site free from redness, swelling, or

## 2017-08-05 NOTE — PLAN OF CARE
Problem: PAIN - ADULT  Goal: Verbalizes/displays adequate comfort level or patient's stated pain goal  INTERVENTIONS:  - Encourage pt to monitor pain and request assistance  - Assess pain using appropriate pain scale  - Administer analgesics based on type Patient A&OX4 with no neuro deficits noted. Patient encouraged to ambulate at least 3-4 times per day, patient verbalizes understanding.  Writing RN encouraged patient to continue using incentive spirometer and pillow as splint with coughing and deep breathi

## 2017-08-05 NOTE — DISCHARGE SUMMARY
[facility] [place], operated by [facility] Discharge Summary    Patient ID:  Eliseo Lefort  [id number]  28year old  8/27/1985    Admit date: 7/29/2017  Discharge date and time: 8/5/17  Attending Physician: Octaviano Asher MD  Primary Care Physician: MD SACHIN Jett tolerating PO        Day of discharge Exam   08/05/17  0818   BP: 127/77   Pulse: 78   Resp: 14   Temp: 98.4 °F (36.9 °C)     Exam on day of discharge:  Gen: No acute distress, alert and oriented  CV: RRR, +s1/s2  Lungs: CTAB, good respiratory effort  Abdo pelvis were obtained. COMPARISON:  [place], CT APPENDIX ABD/PEL WO CONTRAST (CPT=74176), 7/29/2017, 4:04. INDICATIONS:  Ileus  PATIENT STATED HISTORY: (As transcribed by Technologist)  Patient provided no additional information.         CONCLUSION: obstruction. LIVER:  Normal.  No enlargement, atrophy, abnormal density, or significant focal lesion. BILIARY:  Normal.  No visible dilatation or calcification. PANCREAS:  Normal.  No lesion, fluid collection, ductal dilatation, or atrophy.   SPLEEN:  Nor

## 2018-11-06 PROBLEM — F32.2 MDD (MAJOR DEPRESSIVE DISORDER), SINGLE EPISODE, SEVERE , NO PSYCHOSIS (HCC): Status: ACTIVE | Noted: 2018-11-06

## 2021-08-08 ENCOUNTER — APPOINTMENT (OUTPATIENT)
Dept: GENERAL RADIOLOGY | Facility: HOSPITAL | Age: 36
End: 2021-08-08
Attending: EMERGENCY MEDICINE
Payer: COMMERCIAL

## 2021-08-08 PROBLEM — F33.9 MDD (MAJOR DEPRESSIVE DISORDER), RECURRENT EPISODE (HCC): Status: ACTIVE | Noted: 2021-08-08

## 2021-08-08 PROCEDURE — 73130 X-RAY EXAM OF HAND: CPT | Performed by: EMERGENCY MEDICINE

## 2021-08-08 NOTE — ED NOTES
Pt states that he \"doesn't care\" where he goes for treatment, however pt's wife reports that she believes that pt had a \"much better\" experience at [facility] and would prefer to go there.      Pt confirmed that he \"doesn't care\" where he goes

## 2021-08-08 NOTE — ED PROVIDER NOTES
Patient has a distal phalanx fracture of his right pinky finger. The skin is intact he is able to flex and extend at the DIP PIP MCP of the fifth digit. Finger was stacey taped and the injury was explained to the patient.     Patient is medically cleared f

## 2021-08-08 NOTE — ED PROVIDER NOTES
Patient Seen in: [facility] Emergency Department      History   Patient presents with:  Eval-P    Stated Complaint: Eval P    HPI/Subjective:   HPI    76-year-old male with a history of chronic anxiety and depression who was upset about issues relat no edema, normal peripheral pulses   Neuro: Alert oriented and nonfocal         ED Course     Labs Reviewed   COMP METABOLIC PANEL (14) - Abnormal; Notable for the following components:       Result Value    Glucose 122 (*)     BUN 23 (*)     Alkaline Phos DIFFERENTIAL[[id number]]          Abnormal            Final result                 Please view results for these tests on the individual orders.    DRUG SCREEN 7 W/CONFIRMATION, URINE   URINALYSIS WITH CULTURE REFLEX   RAINBOW DRAW LAVENDER   RAINBOW DRAW CHARLES

## 2021-08-08 NOTE — ED NOTES
Updated pt's wife as to probable plan of care (transfer to [facility]). Pt's wife confirmed understanding.

## 2021-08-08 NOTE — ED QUICK NOTES
Devin Andrade, [facility] caseworker, at bedside for assessment.

## 2021-08-08 NOTE — BH LEVEL OF CARE ASSESSMENT
Crisis Evaluation Assessment    Dianne Elizabeth YOB: 1985   Age 30year old MRN [id number]   Location [address] [facility] Attending Shahrzad Ng MD      Patient's legal sex: male  Patient identifies as: male  Patient's bi days): Yes (Pt reports that he attempted to wrap blanket around his neck prior to this writer entering the room)  4. Have you had these thoughts and had some intention of acting on them? (past 30 days): Yes  5a.  Have you started to work out or worked out t admitted psychiatrically, to be discussed with pt's collateral  Access to Firearm/Weapon: No  Discussion of Removal of Firearm/Weapon: Pt denies  Do you have a firearm owner ID card?: No  Collateral for any access to means/firearms/weapons: Pt's wife/colla Social History:  Pt reports living in the home with wife and 332 year old daughter. Pt reports support system is therapist but denies having \"other friends\". Pt denies legal history.              Donny and Complex (as applicable): morning  Thought Patterns  Clarity/Relevance: Coherent;Logical;Relevant to topic  Flow: Organized  Content: Ordinary  Level of Consciousness: Alert  Level of Consciousness: Alert  Behavior  Exhibited behavior: Appropriate to situation;Participated      Dis Services  Patient Verbalized Understanding: Yes        Diagnoses:  Primary Psychiatric Diagnosis  Major Depressive Disorder, Recurrent Episode, Severe, Without Psychotic Features (F33.2)     Secondary Psychiatric Diagnoses  Generalized Anxiety Disorder (F4

## 2021-08-08 NOTE — PROGRESS NOTES
08/08/21 1252   COVID Exposure Risk Screening   Have you been practicing social distancing? Yes   Have you been wearing a mask when in the community? Yes   Are the people you live with following social distancing and wearing a mask?  Yes  (Pt lives with

## 2021-08-08 NOTE — ED INITIAL ASSESSMENT (HPI)
Pt here via EMS due to SI. Pt and his wife are having problems and yesterday he left. Today he came back in an attempt to work things out.  He stated that it was not working so he hit himself with a vacuum ion the head and then also hit his head against a d

## 2021-08-08 NOTE — ED NOTES
Pt signed rights ppw but did not sign voluntary ppw. Activated petition scanned into pt chart with cert and emailed to [facility].

## 2021-08-08 NOTE — ED NOTES
Petition, certificate and rights emailed to [facility]. Pt is involuntary. Rights reviewed and copy of petition and rights provided to pt and scanned into pt's chart.

## 2021-08-09 PROBLEM — S00.93XA CONTUSION OF HEAD: Status: ACTIVE | Noted: 2021-08-09

## 2021-08-09 PROBLEM — E78.2 MIXED HYPERLIPIDEMIA: Status: ACTIVE | Noted: 2021-08-09

## 2022-04-11 ENCOUNTER — OFFICE VISIT (OUTPATIENT)
Dept: URGENT CARE | Age: 37
End: 2022-04-11

## 2022-04-11 VITALS
OXYGEN SATURATION: 98 % | TEMPERATURE: 97.8 F | SYSTOLIC BLOOD PRESSURE: 144 MMHG | HEART RATE: 97 BPM | DIASTOLIC BLOOD PRESSURE: 77 MMHG | RESPIRATION RATE: 16 BRPM

## 2022-04-11 DIAGNOSIS — J10.1 INFLUENZA B: ICD-10-CM

## 2022-04-11 DIAGNOSIS — R09.81 SINUS CONGESTION: Primary | ICD-10-CM

## 2022-04-11 LAB
FLUAV AG UPPER RESP QL IA.RAPID: NEGATIVE
FLUBV AG UPPER RESP QL IA.RAPID: POSITIVE
SARS-COV+SARS-COV-2 AG RESP QL IA.RAPID: NOT DETECTED

## 2022-04-11 PROCEDURE — 99204 OFFICE O/P NEW MOD 45 MIN: CPT | Performed by: EMERGENCY MEDICINE

## 2022-04-11 PROCEDURE — 87426 SARSCOV CORONAVIRUS AG IA: CPT | Performed by: EMERGENCY MEDICINE

## 2022-04-11 PROCEDURE — 87804 INFLUENZA ASSAY W/OPTIC: CPT | Performed by: EMERGENCY MEDICINE

## 2022-04-11 RX ORDER — BENZONATATE 100 MG/1
100 CAPSULE ORAL 3 TIMES DAILY PRN
Qty: 30 CAPSULE | Refills: 0 | Status: SHIPPED | OUTPATIENT
Start: 2022-04-11 | End: 2022-04-21

## 2022-04-11 RX ORDER — FLUOXETINE HYDROCHLORIDE 40 MG/1
40 CAPSULE ORAL DAILY
COMMUNITY
Start: 2022-03-11

## (undated) DEVICE — LAP LIGASURE 5MM BLUNT

## (undated) DEVICE — THE ECHELON FLEX POWERED PLUS ARTICULATING ENDOSCOPIC LINEAR CUTTERS ARE STERILE, SINGLE PATIENT USE INSTRUMENTS THAT SIMULTANEOUSLYCUT AND STAPLE TISSUE. THERE ARE SIX STAGGERED ROWS OF STAPLES, THREE ON EITHER SIDE OF THE CUT LINE. THE ECHELON FLEX 45 POWERED PLUSINSTRUMENTS HAVE A STAPLE LINE THAT IS APPROXIMATELY 45 MM LONG AND A CUT LINE THAT IS APPROXIMATELY 42 MM LONG. THE SHAFT CAN ROTATE FREELYIN BOTH DIRECTIONS AND AN ARTICULATION MECHANISM ENABLES THE DISTAL PORTION OF THE SHAFT TO PIVOT TO FACILITATE LATERAL ACCESS TO THE OPERATIVESITE.THE INSTRUMENTS ARE PACKAGED WITH A PRIMARY LITHIUM BATTERY PACK THAT MUST BE INSTALLED PRIOR TO USE. THERE ARE SPECIFIC REQUIREMENTS FORDISPOSING OF THE BATTERY PACK. REFER TO THE BATTERY PACK DISPOSAL SECTION.THE INSTRUMENTS ARE PACKAGED WITHOUT A RELOAD AND MUST BE LOADED PRIOR TO USE. A STAPLE RETAINING CAP ON THE RELOAD PROTECTS THE STAPLE LEGPOINTS DURING SHIPPING AND TRANSPORTATION. THE INSTRUMENTS’ LOCK-OUT FEATURE IS DESIGNED TO PREVENT A USED OR IMPROPERLY INSTALLED RELOADFROM BEING REFIRED OR AN INSTRUMENT FROM BEING FIRED WITHOUT A RELOAD.: Brand: ECHELON FLEX

## (undated) DEVICE — REM POLYHESIVE ADULT PATIENT RETURN ELECTRODE: Brand: VALLEYLAB

## (undated) DEVICE — SUTURE VICRYL 0

## (undated) DEVICE — SUTURE ETHILON 2-0 FS

## (undated) DEVICE — SOL  .9 1000ML BTL

## (undated) DEVICE — KENDALL SCD EXPRESS SLEEVES, KNEE LENGTH, MEDIUM: Brand: KENDALL SCD

## (undated) DEVICE — DRAIN CHANNEL 19FR BLAKE

## (undated) DEVICE — Device

## (undated) DEVICE — ENDOPATH XCEL DILATING TIP TROCARS WITH STABILITY SLEEVES: Brand: ENDOPATH XCEL

## (undated) DEVICE — UNDYED BRAIDED (POLYGLACTIN 910), SYNTHETIC ABSORBABLE SUTURE: Brand: COATED VICRYL

## (undated) DEVICE — STERILE POLYISOPRENE POWDER-FREE SURGICAL GLOVES: Brand: PROTEXIS

## (undated) DEVICE — LAP CHOLE/APPY CDS-LF: Brand: MEDLINE INDUSTRIES, INC.

## (undated) DEVICE — ENDO POUCH

## (undated) DEVICE — ENDOSCOPIC LINEAR CUTTER RELOADS BLUE 3.5MM: Brand: ECHELON; ENDOPATH

## (undated) DEVICE — SUTURE VICRYL 0 UR-6

## (undated) DEVICE — DRAIN RELIAVAC 100CC

## (undated) NOTE — MR AVS SNAPSHOT
[address] [facility]  [place] [address]  [phone]               Thank you for choosing us for your health care visit with Jvoani Westbrook PA-C.   We are glad to serve you and happy to provide you with sometimes headache. If it settles in your stomach and intestinal tract, it may cause vomiting and diarrhea. Sometimes it causes vague symptoms like \"aching all over,\" feeling tired, loss of appetite, or fever.   A viral illness usually lasts 1 to 2 weeks, Follow up with your healthcare provider if you do not improve over the next week.   Call 911  Get emergency medical care if any of the following occur:  · Convulsion  · Feeling weak, dizzy, or like you are going to faint  · Chest pain, shortness of breath, Where to Get Your Medications      These medications were sent to [facility] [address], [address] AT [address] [place] [address] [address] [place] [address] 29, [phone], [phone]  [place] [address]     Phone:  [phone]

## (undated) NOTE — LETTER
08/05/17    Frank Quiñones      To Whom It May Concern: The above patient was at [facility] for treatment of a medical condition from July 29, 2017-August 5, 2017. The patient may return to work when cleared by surgeon.       Sincerely,  Dr. Pasha Donald

## (undated) NOTE — MR AVS SNAPSHOT
[address] [facility]  [place] [address] [phone]               Thank you for choosing us for your health care visit with JULIANA Roberts.   We are glad to serve you and happy to provide you wi no cure for canker sores. Treatment focuses on relieving symptoms and shortening outbreaks. Treatments may include:  · Prescription or over-the-counter skin treatments to apply to the sores.  Steroids for your skin (topical) may protect the canker sores fro 122/80 mmHg 83 98.3 °F (36.8 °C)            Current Medications          This list is accurate as of: 1/29/17 10:33 AM.  Always use your most recent med list.                maalox/diphenhydramine/sucralfate Susp   Take 10 mL by mouth 4 (four) times daily

## (undated) NOTE — LETTER
[facility]  Florentino Barriosjose maria [address] [facility], [address] [place] [address]    Consent for Operation    Date: __________________    Time: _______________    1.  I authorize the performance upon Thien Zazueta the following operation:    Procedure(s):  LAPAROSCOPIC APPENDECT videotape. The hospitals will not be responsible for storage or maintenance of this tape. 6. For the purpose of advancing medical education, I consent to the admittance of observers to the Operating Room.     7. I authorize the use of any specimen, organs Signature of Patient:   ___________________________    When the patient is a minor or mentally incompetent to give consent:  Signature of person authorized to consent for patient: ___________________________   Relationship to patient: _____________________ these medicines may increase my risk of anesthetic complications. · If I am allergic to anything or have had a reaction to anesthesia before. 3. I understand how the anesthesia medicine will help me (benefits).     4. I understand that with any type of patient’s representative) and answered their questions. The patient or their representative has agreed to have anesthesia services.     _____________________________________________________________________________  Witness        Date   Time  I have lou